# Patient Record
Sex: FEMALE | Race: WHITE | NOT HISPANIC OR LATINO
[De-identification: names, ages, dates, MRNs, and addresses within clinical notes are randomized per-mention and may not be internally consistent; named-entity substitution may affect disease eponyms.]

---

## 2019-07-09 ENCOUNTER — APPOINTMENT (OUTPATIENT)
Dept: OTOLARYNGOLOGY | Facility: CLINIC | Age: 49
End: 2019-07-09
Payer: COMMERCIAL

## 2019-07-09 VITALS
DIASTOLIC BLOOD PRESSURE: 83 MMHG | WEIGHT: 270 LBS | SYSTOLIC BLOOD PRESSURE: 132 MMHG | HEART RATE: 76 BPM | OXYGEN SATURATION: 97 % | BODY MASS INDEX: 43.39 KG/M2 | HEIGHT: 66 IN

## 2019-07-09 DIAGNOSIS — Z83.3 FAMILY HISTORY OF DIABETES MELLITUS: ICD-10-CM

## 2019-07-09 DIAGNOSIS — Z86.718 PERSONAL HISTORY OF OTHER VENOUS THROMBOSIS AND EMBOLISM: ICD-10-CM

## 2019-07-09 DIAGNOSIS — C11.9 MALIGNANT NEOPLASM OF NASOPHARYNX, UNSPECIFIED: ICD-10-CM

## 2019-07-09 DIAGNOSIS — Z82.49 FAMILY HISTORY OF ISCHEMIC HEART DISEASE AND OTHER DISEASES OF THE CIRCULATORY SYSTEM: ICD-10-CM

## 2019-07-09 DIAGNOSIS — Z86.711 PERSONAL HISTORY OF PULMONARY EMBOLISM: ICD-10-CM

## 2019-07-09 DIAGNOSIS — Z87.39 PERSONAL HISTORY OF OTHER DISEASES OF THE MUSCULOSKELETAL SYSTEM AND CONNECTIVE TISSUE: ICD-10-CM

## 2019-07-09 DIAGNOSIS — Z80.9 FAMILY HISTORY OF MALIGNANT NEOPLASM, UNSPECIFIED: ICD-10-CM

## 2019-07-09 DIAGNOSIS — Z78.9 OTHER SPECIFIED HEALTH STATUS: ICD-10-CM

## 2019-07-09 DIAGNOSIS — Z86.79 PERSONAL HISTORY OF OTHER DISEASES OF THE CIRCULATORY SYSTEM: ICD-10-CM

## 2019-07-09 PROCEDURE — 31231 NASAL ENDOSCOPY DX: CPT

## 2019-07-09 PROCEDURE — 99202 OFFICE O/P NEW SF 15 MIN: CPT | Mod: 25

## 2019-07-09 NOTE — PHYSICAL EXAM
[de-identified] : No palpable enlarged LNs [Nasal Endoscopy Performed] : nasal endoscopy was performed, see procedure section for findings [de-identified] : fullness of inferior/lateral aspect of nasopharynx pressing on inferior nasal turbinate [Normal] : palatine tonsils are normal [de-identified] : depressed right soft palate [de-identified] : mulitple dental carries with metal fillings

## 2019-07-09 NOTE — HISTORY OF PRESENT ILLNESS
[de-identified] : 48F with a PMH of HTN, PE and DVT on xarelto, here per consult from her dentist for maxillary mass seen on dental xray. Patient reports she had previously had an infection/abscess of her right upper gingiva which improved with treatment, but returned which prompted dentist to take an xray with plans to remove the tooth. On xray, patient was found to have right maxillary mass. Patient also admits to right ear hearing loss for 1 year, right eye blurriness for 4 months and right nasal congestion worsening in last 4 months. Patient also admits to decreased sense of smell, right sinus pressure and right soft palate fullness. Patient denies any epistaxis.

## 2019-07-09 NOTE — REVIEW OF SYSTEMS
[Nose Bleeds] : no nose bleeds [Nasal Congestion] : nasal congestion [Sinus Pressure] : sinus pressure [Sense Of Smell Problem] : sense of smell problem [Negative] : Heme/Lymph

## 2019-07-11 ENCOUNTER — OTHER (OUTPATIENT)
Age: 49
End: 2019-07-11

## 2019-07-12 ENCOUNTER — APPOINTMENT (OUTPATIENT)
Dept: OTOLARYNGOLOGY | Facility: CLINIC | Age: 49
End: 2019-07-12
Payer: COMMERCIAL

## 2019-07-12 VITALS
SYSTOLIC BLOOD PRESSURE: 127 MMHG | HEIGHT: 66 IN | WEIGHT: 270 LBS | DIASTOLIC BLOOD PRESSURE: 86 MMHG | BODY MASS INDEX: 43.39 KG/M2 | HEART RATE: 65 BPM | OXYGEN SATURATION: 98 %

## 2019-07-12 PROCEDURE — 99212 OFFICE O/P EST SF 10 MIN: CPT

## 2019-07-16 NOTE — PHYSICAL EXAM
[de-identified] : No palpable enlarged LNs [Nasal Endoscopy Performed] : nasal endoscopy was performed, see procedure section for findings [de-identified] : fullness of inferior/lateral aspect of nasopharynx pressing on inferior nasal turbinate [Normal] : palatine tonsils are normal [de-identified] : mulitple dental carries with metal fillings [de-identified] : depressed right soft palate

## 2019-07-16 NOTE — REVIEW OF SYSTEMS
[Nasal Congestion] : nasal congestion [Nose Bleeds] : no nose bleeds [Sinus Pressure] : sinus pressure [Sense Of Smell Problem] : sense of smell problem [Negative] : Heme/Lymph

## 2019-07-16 NOTE — ASSESSMENT
[FreeTextEntry1] : 48F with a PMH of HTN, PE and DVT on xarelto, here with right maxillary mass seen on dental xray for which she was referred; CT head and neck shows mucocele in R maxillary sinus, plan for OR.\par \par Plan:\par - plan for OR 7/18 for endoscopic endonasal possible transoral approach maxillary sinus tumor excision\par - pre-op to be completed\par - post op, f/u path to determine further treatment\par

## 2019-07-16 NOTE — HISTORY OF PRESENT ILLNESS
[de-identified] : 48F with a PMH of HTN, PE and DVT on xarelto, here per consult from her dentist for maxillary mass seen on dental xray. Patient reports she had previously had an infection/abscess of her right upper gingiva which improved with treatment, but returned which prompted dentist to take an xray with plans to remove the tooth. On xray, patient was found to have right maxillary mass. Patient also admits to right ear hearing loss for 1 year, right eye blurriness for 4 months and right nasal congestion worsening in last 4 months. Patient also admits to decreased sense of smell, right sinus pressure and right soft palate fullness. Patient denies any epistaxis.

## 2019-07-17 ENCOUNTER — APPOINTMENT (OUTPATIENT)
Dept: OTOLARYNGOLOGY | Facility: CLINIC | Age: 49
End: 2019-07-17
Payer: COMMERCIAL

## 2019-07-17 ENCOUNTER — TRANSCRIPTION ENCOUNTER (OUTPATIENT)
Age: 49
End: 2019-07-17

## 2019-07-17 VITALS
OXYGEN SATURATION: 98 % | BODY MASS INDEX: 43.39 KG/M2 | HEART RATE: 60 BPM | DIASTOLIC BLOOD PRESSURE: 84 MMHG | HEIGHT: 66 IN | SYSTOLIC BLOOD PRESSURE: 135 MMHG | WEIGHT: 270 LBS

## 2019-07-17 PROCEDURE — 99212 OFFICE O/P EST SF 10 MIN: CPT

## 2019-07-17 NOTE — HISTORY OF PRESENT ILLNESS
[de-identified] : 48F with a PMH of HTN, PE and DVT on xarelto, here per consult from her dentist for maxillary mass seen on dental xray. Patient reports she had previously had an infection/abscess of her right upper gingiva which improved with treatment, but returned which prompted dentist to take an xray with plans to remove the tooth. On xray, patient was found to have right maxillary mass. Patient also admits to right ear hearing loss for 1 year, right eye blurriness for 4 months and right nasal congestion worsening in last 4 months. Patient also admits to decreased sense of smell, right sinus pressure and right soft palate fullness. Patient denies any epistaxis.

## 2019-07-17 NOTE — ASU PATIENT PROFILE, ADULT - PMH
Atrial tachycardia    Cancer  of nasopharynx Atrial tachycardia    Cancer  of nasopharynx  DVT (deep venous thrombosis)    HTN (hypertension)    Hypothyroidism    Pulmonary embolism

## 2019-07-17 NOTE — PHYSICAL EXAM
[de-identified] : Non palpable enlarged LNs [Nasal Endoscopy Performed] : nasal endoscopy was performed, see procedure section for findings [de-identified] : fullness of inferior/lateral aspect of nasopharynx pressing on inferior nasal turbinate [Normal] : palatine tonsils are normal [de-identified] : mulitple dental carries with metal fillings [de-identified] : depressed right soft palate

## 2019-07-17 NOTE — ASSESSMENT
[FreeTextEntry1] : 48F with a PMH of HTN, PE and DVT on xarelto, here with right maxillary mass seen on dental xray for which she was referred; CT head and neck shows mucocele in R maxillary sinus, plan for OR. On CT noted to have enlarged LNs and came in for evaluation of that.\par \par Plan:\par - plan for OR 7/18 for endoscopic endonasal possible transoral approach maxillary sinus tumor excision\par - no need for FNA at this time\par

## 2019-07-18 ENCOUNTER — RESULT REVIEW (OUTPATIENT)
Age: 49
End: 2019-07-18

## 2019-07-18 ENCOUNTER — OUTPATIENT (OUTPATIENT)
Dept: OUTPATIENT SERVICES | Facility: HOSPITAL | Age: 49
LOS: 1 days | Discharge: HOME | End: 2019-07-18
Payer: COMMERCIAL

## 2019-07-18 ENCOUNTER — APPOINTMENT (OUTPATIENT)
Dept: OTOLARYNGOLOGY | Facility: HOSPITAL | Age: 49
End: 2019-07-18

## 2019-07-18 VITALS
HEIGHT: 66 IN | DIASTOLIC BLOOD PRESSURE: 67 MMHG | SYSTOLIC BLOOD PRESSURE: 139 MMHG | HEART RATE: 63 BPM | WEIGHT: 266.76 LBS | OXYGEN SATURATION: 100 % | RESPIRATION RATE: 16 BRPM | TEMPERATURE: 97 F

## 2019-07-18 DIAGNOSIS — I26.99 OTHER PULMONARY EMBOLISM WITHOUT ACUTE COR PULMONALE: ICD-10-CM

## 2019-07-18 DIAGNOSIS — Z41.9 ENCOUNTER FOR PROCEDURE FOR PURPOSES OTHER THAN REMEDYING HEALTH STATE, UNSPECIFIED: Chronic | ICD-10-CM

## 2019-07-18 LAB
GRAM STN FLD: SIGNIFICANT CHANGE UP
SPECIMEN SOURCE: SIGNIFICANT CHANGE UP

## 2019-07-18 PROCEDURE — 31267 ENDOSCOPY MAXILLARY SINUS: CPT | Mod: RT

## 2019-07-18 RX ORDER — IBUPROFEN 200 MG
400 TABLET ORAL EVERY 6 HOURS
Refills: 0 | Status: DISCONTINUED | OUTPATIENT
Start: 2019-07-18 | End: 2019-07-19

## 2019-07-18 RX ORDER — SODIUM CHLORIDE 9 MG/ML
1000 INJECTION, SOLUTION INTRAVENOUS
Refills: 0 | Status: DISCONTINUED | OUTPATIENT
Start: 2019-07-18 | End: 2019-07-19

## 2019-07-18 RX ORDER — RIVAROXABAN 15 MG-20MG
20 KIT ORAL DAILY
Refills: 0 | Status: DISCONTINUED | OUTPATIENT
Start: 2019-07-18 | End: 2019-07-18

## 2019-07-18 RX ORDER — ONDANSETRON 8 MG/1
4 TABLET, FILM COATED ORAL ONCE
Refills: 0 | Status: DISCONTINUED | OUTPATIENT
Start: 2019-07-18 | End: 2019-07-19

## 2019-07-18 RX ORDER — MORPHINE SULFATE 50 MG/1
4 CAPSULE, EXTENDED RELEASE ORAL
Refills: 0 | Status: DISCONTINUED | OUTPATIENT
Start: 2019-07-18 | End: 2019-07-18

## 2019-07-18 RX ORDER — OXYCODONE AND ACETAMINOPHEN 5; 325 MG/1; MG/1
1 TABLET ORAL ONCE
Refills: 0 | Status: DISCONTINUED | OUTPATIENT
Start: 2019-07-18 | End: 2019-07-19

## 2019-07-18 RX ORDER — ACETAMINOPHEN 500 MG
650 TABLET ORAL ONCE
Refills: 0 | Status: COMPLETED | OUTPATIENT
Start: 2019-07-18 | End: 2019-07-19

## 2019-07-18 RX ORDER — RIVAROXABAN 15 MG-20MG
20 KIT ORAL DAILY
Refills: 0 | Status: DISCONTINUED | OUTPATIENT
Start: 2019-07-19 | End: 2019-07-19

## 2019-07-18 RX ORDER — LEVOTHYROXINE SODIUM 125 MCG
100 TABLET ORAL ONCE
Refills: 0 | Status: COMPLETED | OUTPATIENT
Start: 2019-07-18 | End: 2019-07-19

## 2019-07-18 RX ORDER — CEPHALEXIN 500 MG
500 CAPSULE ORAL ONCE
Refills: 0 | Status: COMPLETED | OUTPATIENT
Start: 2019-07-18 | End: 2019-07-19

## 2019-07-18 RX ORDER — HYDROCHLOROTHIAZIDE 25 MG
12.5 TABLET ORAL ONCE
Refills: 0 | Status: DISCONTINUED | OUTPATIENT
Start: 2019-07-18 | End: 2019-07-19

## 2019-07-18 RX ORDER — LOSARTAN POTASSIUM 100 MG/1
50 TABLET, FILM COATED ORAL ONCE
Refills: 0 | Status: DISCONTINUED | OUTPATIENT
Start: 2019-07-18 | End: 2019-07-19

## 2019-07-18 RX ADMIN — MORPHINE SULFATE 4 MILLIGRAM(S): 50 CAPSULE, EXTENDED RELEASE ORAL at 16:33

## 2019-07-18 RX ADMIN — MORPHINE SULFATE 4 MILLIGRAM(S): 50 CAPSULE, EXTENDED RELEASE ORAL at 15:40

## 2019-07-18 RX ADMIN — MORPHINE SULFATE 4 MILLIGRAM(S): 50 CAPSULE, EXTENDED RELEASE ORAL at 16:01

## 2019-07-18 NOTE — PACU DISCHARGE NOTE - COMMENTS
report given to Yamileth NORIEGA. VSS. Pt in NAD. Patient comfortable IV intact. transported to room on bed accompanied by transporters.

## 2019-07-18 NOTE — H&P ADULT - NSICDXPASTMEDICALHX_GEN_ALL_CORE_FT
PAST MEDICAL HISTORY:  Atrial tachycardia     Cancer of nasopharynx    DVT (deep venous thrombosis)     HTN (hypertension)     Hypothyroidism     Pulmonary embolism

## 2019-07-18 NOTE — H&P ADULT - HISTORY OF PRESENT ILLNESS
HPI: 48F with right maxillary sinus mucocele, s/p endoscopic exploration and drainage on 7/18. Seen in PACU, doing well.

## 2019-07-18 NOTE — H&P ADULT - NSHPPHYSICALEXAM_GEN_ALL_CORE
PHYSICAL EXAM:    ENT EXAM-   NAD  No hoarseness.   AAO3  No bloody discharge from nose  OC/OP:  Tonsils present /  absent.  Floor of mouth, buccal mucosa, lips, hard palate, soft palate, uvula, posterior pharyngeal wall normal.  Mucosa moist.  Neuro/Psych:  A&O x 3.  Mood stable.  Affect bright.  Cranial nerves: 2-12 grossly intact bilaterally.  Eyes:  EOMI, no nystagmus.  Pulm:  No dyspnea, non-labored breathing  Skin:  No rash or lesions on exposed skin of head/neck

## 2019-07-19 ENCOUNTER — TRANSCRIPTION ENCOUNTER (OUTPATIENT)
Age: 49
End: 2019-07-19

## 2019-07-19 VITALS
SYSTOLIC BLOOD PRESSURE: 94 MMHG | RESPIRATION RATE: 15 BRPM | HEART RATE: 55 BPM | OXYGEN SATURATION: 100 % | DIASTOLIC BLOOD PRESSURE: 59 MMHG | TEMPERATURE: 99 F

## 2019-07-19 PROBLEM — I26.99 OTHER PULMONARY EMBOLISM WITHOUT ACUTE COR PULMONALE: Chronic | Status: ACTIVE | Noted: 2019-07-17

## 2019-07-19 PROBLEM — I82.409 ACUTE EMBOLISM AND THROMBOSIS OF UNSPECIFIED DEEP VEINS OF UNSPECIFIED LOWER EXTREMITY: Chronic | Status: ACTIVE | Noted: 2019-07-17

## 2019-07-19 PROBLEM — C80.1 MALIGNANT (PRIMARY) NEOPLASM, UNSPECIFIED: Chronic | Status: ACTIVE | Noted: 2019-07-17

## 2019-07-19 PROBLEM — I47.1 SUPRAVENTRICULAR TACHYCARDIA: Chronic | Status: ACTIVE | Noted: 2019-07-17

## 2019-07-19 PROBLEM — I10 ESSENTIAL (PRIMARY) HYPERTENSION: Chronic | Status: ACTIVE | Noted: 2019-07-17

## 2019-07-19 PROBLEM — E03.9 HYPOTHYROIDISM, UNSPECIFIED: Chronic | Status: ACTIVE | Noted: 2019-07-17

## 2019-07-19 PROCEDURE — 87102 FUNGUS ISOLATION CULTURE: CPT

## 2019-07-19 PROCEDURE — 87075 CULTR BACTERIA EXCEPT BLOOD: CPT

## 2019-07-19 PROCEDURE — 87070 CULTURE OTHR SPECIMN AEROBIC: CPT

## 2019-07-19 PROCEDURE — 88305 TISSUE EXAM BY PATHOLOGIST: CPT

## 2019-07-19 PROCEDURE — 87186 SC STD MICRODIL/AGAR DIL: CPT

## 2019-07-19 PROCEDURE — 31267 ENDOSCOPY MAXILLARY SINUS: CPT | Mod: RT

## 2019-07-19 RX ORDER — IBUPROFEN 200 MG
1 TABLET ORAL
Qty: 0 | Refills: 0 | DISCHARGE
Start: 2019-07-19

## 2019-07-19 RX ORDER — LOSARTAN POTASSIUM 100 MG/1
1 TABLET, FILM COATED ORAL
Qty: 0 | Refills: 0 | DISCHARGE
Start: 2019-07-19

## 2019-07-19 RX ORDER — RIVAROXABAN 15 MG-20MG
1 KIT ORAL
Qty: 0 | Refills: 0 | DISCHARGE
Start: 2019-07-19

## 2019-07-19 RX ADMIN — Medication 100 MICROGRAM(S): at 05:01

## 2019-07-19 RX ADMIN — Medication 500 MILLIGRAM(S): at 05:02

## 2019-07-19 RX ADMIN — OXYCODONE AND ACETAMINOPHEN 1 TABLET(S): 5; 325 TABLET ORAL at 05:01

## 2019-07-19 RX ADMIN — Medication 650 MILLIGRAM(S): at 01:31

## 2019-07-19 RX ADMIN — OXYCODONE AND ACETAMINOPHEN 1 TABLET(S): 5; 325 TABLET ORAL at 06:31

## 2019-07-19 RX ADMIN — Medication 650 MILLIGRAM(S): at 00:33

## 2019-07-19 NOTE — DISCHARGE NOTE PROVIDER - CARE PROVIDER_API CALL
Carlos Perrin)  Otolaryngology  [address], [address] [place] [address]  Phone: [phone]  Fax: [phone]  Follow Up Time: 1 week

## 2019-07-19 NOTE — DISCHARGE NOTE PROVIDER - NSDCFUADDINST_GEN_ALL_CORE_FT
No nose blowing, may dab nose with tissue  open mouth sneezes  no heavy lifting or straining  complete 5 day course of antibiotics  resume home xarelto on 7/22

## 2019-07-19 NOTE — DISCHARGE NOTE PROVIDER - HOSPITAL COURSE
48F admitted s/p endoscopic medial maxillectomy and resection of mucocele on xarelto to monitor for post-op bleeding.  No acute events overnight.  Pain controlled, tolerating PO.  Deemed stable for dc on POD1.

## 2019-07-19 NOTE — PROGRESS NOTE ADULT - ASSESSMENT
A/P:  48F admitted s/p endoscopic medial maxillectomy to monitor for post-op bleeding, no acute events overnight  -continue regular diet  -prn analgesia  -ancef while in house  -likely dc today

## 2019-07-19 NOTE — DISCHARGE NOTE NURSING/CASE MANAGEMENT/SOCIAL WORK - NSDCDPATPORTLINK_GEN_ALL_CORE
You can access the [facility] Portal, offered by [facility], by registering with the following website: [email or web address]

## 2019-07-19 NOTE — PROGRESS NOTE ADULT - SUBJECTIVE AND OBJECTIVE BOX
Pt seen and examined at bedside.  No acute events overnight.  Tolerating PO, pain controlled.  Denies salty taste and/or rhinorrhea. No epistaxis.    PE:  NAD  non-labored breathing on RA  nose clear anteriorly  negative reservoir sign   oc/op clear

## 2019-07-19 NOTE — DISCHARGE NOTE PROVIDER - NSDCCPTREATMENT_GEN_ALL_CORE_FT
PRINCIPAL PROCEDURE  Procedure: Excision, mucocele, maxillary sinus, endoscopic  Findings and Treatment:

## 2019-07-21 LAB
-  CEFAZOLIN: SIGNIFICANT CHANGE UP
-  CLINDAMYCIN: SIGNIFICANT CHANGE UP
-  ERYTHROMYCIN: SIGNIFICANT CHANGE UP
-  LINEZOLID: SIGNIFICANT CHANGE UP
-  OXACILLIN: SIGNIFICANT CHANGE UP
-  PENICILLIN: SIGNIFICANT CHANGE UP
-  RIFAMPIN: SIGNIFICANT CHANGE UP
-  TRIMETHOPRIM/SULFAMETHOXAZOLE: SIGNIFICANT CHANGE UP
-  VANCOMYCIN: SIGNIFICANT CHANGE UP
CULTURE RESULTS: SIGNIFICANT CHANGE UP
METHOD TYPE: SIGNIFICANT CHANGE UP
ORGANISM # SPEC MICROSCOPIC CNT: SIGNIFICANT CHANGE UP
ORGANISM # SPEC MICROSCOPIC CNT: SIGNIFICANT CHANGE UP
SPECIMEN SOURCE: SIGNIFICANT CHANGE UP

## 2019-07-22 ENCOUNTER — APPOINTMENT (OUTPATIENT)
Dept: OTOLARYNGOLOGY | Facility: CLINIC | Age: 49
End: 2019-07-22
Payer: COMMERCIAL

## 2019-07-22 VITALS
SYSTOLIC BLOOD PRESSURE: 123 MMHG | WEIGHT: 270 LBS | OXYGEN SATURATION: 98 % | DIASTOLIC BLOOD PRESSURE: 90 MMHG | HEART RATE: 73 BPM | HEIGHT: 66 IN | BODY MASS INDEX: 43.39 KG/M2

## 2019-07-22 PROCEDURE — 31231 NASAL ENDOSCOPY DX: CPT

## 2019-07-22 PROCEDURE — 99212 OFFICE O/P EST SF 10 MIN: CPT | Mod: 25

## 2019-07-22 RX ORDER — SULFAMETHOXAZOLE AND TRIMETHOPRIM 800; 160 MG/1; MG/1
800-160 TABLET ORAL TWICE DAILY
Qty: 28 | Refills: 0 | Status: ACTIVE | COMMUNITY
Start: 2019-07-22 | End: 1900-01-01

## 2019-07-22 NOTE — PROCEDURE
[Post-Op Patency] : post-op patency [Topical Lidocaine] : topical lidocaine [Rigid Endoscope] : examined with a rigid endoscope

## 2019-07-22 NOTE — ASSESSMENT
[FreeTextEntry1] : 48F with a PMH of HTN, PE and DVT on xarelto, here with right maxillary mass seen on dental xray for which she was referred; CT head and neck shows mucocele in R maxillary sinus, plan for OR. On CT noted to have enlarged LNs and came in for evaluation of that.\par \par now s/p right medial maxillectomy and removal of R mucocele on 7/18/19\par -breathing much better post op\par -cx with S.epi- change abx from augmentin to bactrim today\par -start nasal saline\par -f/u 1 week to 2nd post op visit and to review path \par

## 2019-07-22 NOTE — PHYSICAL EXAM
[de-identified] : Non palpable enlarged LNs [Nasal Endoscopy Performed] : nasal endoscopy was performed, see procedure section for findings [de-identified] : fullness of inferior/lateral aspect of nasopharynx pressing on inferior nasal turbinate [Normal] : palatine tonsils are normal [de-identified] : mulitple dental carries with metal fillings [de-identified] : depressed right soft palate

## 2019-07-22 NOTE — HISTORY OF PRESENT ILLNESS
[de-identified] : 48F with a PMH of HTN, PE and DVT on xarelto, here per consult from her dentist for maxillary mass seen on dental xray. Patient reports she had previously had an infection/abscess of her right upper gingiva which improved with treatment, but returned which prompted dentist to take an xray with plans to remove the tooth. On xray, patient was found to have right maxillary mass. Patient also admits to right ear hearing loss for 1 year, right eye blurriness for 4 months and right nasal congestion worsening in last 4 months. Patient also admits to decreased sense of smell, right sinus pressure and right soft palate fullness. Patient denies any epistaxis.

## 2019-07-23 LAB — SURGICAL PATHOLOGY STUDY: SIGNIFICANT CHANGE UP

## 2019-07-29 ENCOUNTER — APPOINTMENT (OUTPATIENT)
Dept: OTOLARYNGOLOGY | Facility: CLINIC | Age: 49
End: 2019-07-29
Payer: COMMERCIAL

## 2019-07-29 VITALS
HEART RATE: 74 BPM | OXYGEN SATURATION: 98 % | SYSTOLIC BLOOD PRESSURE: 119 MMHG | HEIGHT: 66 IN | DIASTOLIC BLOOD PRESSURE: 84 MMHG

## 2019-07-29 PROCEDURE — 99212 OFFICE O/P EST SF 10 MIN: CPT | Mod: 25

## 2019-07-29 PROCEDURE — 31231 NASAL ENDOSCOPY DX: CPT

## 2019-07-29 NOTE — PHYSICAL EXAM
[de-identified] : Non palpable enlarged LNs [Nasal Endoscopy Performed] : nasal endoscopy was performed, see procedure section for findings [de-identified] : fullness of inferior/lateral aspect of nasopharynx pressing on inferior nasal turbinate [Normal] : palatine tonsils are normal [de-identified] : mulitple dental carries with metal fillings [de-identified] : depressed right soft palate

## 2019-07-29 NOTE — ASSESSMENT
[FreeTextEntry1] : 48F with a PMH of HTN, PE and DVT on xarelto, found to have R maxillary mass, cystic in nature on CT scan, s/p FESS with R medial maxillary wall resection and mucocele resection 7/18, path showed inflammatory tissue and Cx showed resistance to Augmentin so patient was switched to Bactrim. \par \par Plan:\par - f/u in 2-3 weeks\par - continue nasal saline rinses\par

## 2019-07-29 NOTE — HISTORY OF PRESENT ILLNESS
[de-identified] : 48F with a PMH of HTN, PE and DVT on xarelto, here per consult from her dentist for maxillary mass seen on dental xray. Patient reports she had previously had an infection/abscess of her right upper gingiva which improved with treatment, but returned which prompted dentist to take an xray with plans to remove the tooth. On xray, patient was found to have right maxillary mass. Patient also admits to right ear hearing loss for 1 year, right eye blurriness for 4 months and right nasal congestion worsening in last 4 months. Patient also admits to decreased sense of smell, right sinus pressure and right soft palate fullness. Patient denies any epistaxis. [FreeTextEntry1] : Last visit 7/22- patient cx showed resistance to Augmentin, she was switched to Bactrim.\par Today- patient admits to stopping Bactrim early as it upset her stomach. She denies any fevers or chills.

## 2019-08-17 LAB
CULTURE RESULTS: SIGNIFICANT CHANGE UP
SPECIMEN SOURCE: SIGNIFICANT CHANGE UP

## 2019-08-26 ENCOUNTER — APPOINTMENT (OUTPATIENT)
Dept: OTOLARYNGOLOGY | Facility: CLINIC | Age: 49
End: 2019-08-26

## 2020-07-20 NOTE — ASSESSMENT
[FreeTextEntry1] : 48F with a PMH of HTN, PE and DVT on xarelto, here with right maxillary mass seen on dental xray for which she was referred. \par \par Plan:\par - CT head and neck w/ and w/out\par - possible MRI depending on findings\par - F/u appointment this week after CT scan\par  Unable to educate patient secondary to cognitive and communication limitations. No family present at bedside